# Patient Record
Sex: MALE | Race: WHITE
[De-identification: names, ages, dates, MRNs, and addresses within clinical notes are randomized per-mention and may not be internally consistent; named-entity substitution may affect disease eponyms.]

---

## 2018-09-04 ENCOUNTER — HOSPITAL ENCOUNTER (EMERGENCY)
Dept: HOSPITAL 75 - ER | Age: 53
LOS: 1 days | Discharge: HOME | End: 2018-09-05
Payer: COMMERCIAL

## 2018-09-04 VITALS — HEIGHT: 72 IN | BODY MASS INDEX: 28.44 KG/M2 | WEIGHT: 210 LBS

## 2018-09-04 DIAGNOSIS — R40.2142: ICD-10-CM

## 2018-09-04 DIAGNOSIS — R40.2362: ICD-10-CM

## 2018-09-04 DIAGNOSIS — S02.19XA: Primary | ICD-10-CM

## 2018-09-04 DIAGNOSIS — S02.81XA: ICD-10-CM

## 2018-09-04 DIAGNOSIS — R40.2252: ICD-10-CM
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
## 2018-09-04 PROCEDURE — 70450 CT HEAD/BRAIN W/O DYE: CPT

## 2018-09-04 PROCEDURE — 73110 X-RAY EXAM OF WRIST: CPT

## 2018-09-04 PROCEDURE — 70486 CT MAXILLOFACIAL W/O DYE: CPT

## 2018-09-04 PROCEDURE — 72125 CT NECK SPINE W/O DYE: CPT

## 2018-09-04 NOTE — ED TRAUMA-MULTISYSTEM
General


Chief Complaint:  Trauma-Non Activation


Stated Complaint:  BUCKED OFF HORSE, EYE/FACE INJ


Nursing Triage Note:  


Patient ambulatory to ER with complaint of being bucked off a horse around 16:

00 


today. Patient denies any loss of consciousness and states he got up and walked 


from the scene. Patient states he was dizzy at scene. Patient states this 


evening he was blowing his nose and his right face swelled and right eye 


swelled. Right eye is swelled shut. Patient also complaining of left wrist 

pain. 


 (SAI CONNER)





History of Present Illness


Date Seen by Provider:  Sep 4, 2018


Time Seen by Provider:  22:20


Initial Comments


Patient is a 53-year-old male who presents to the emergency room POV with 

reports of being bucked off a horse this evening around 1600. He reports that 

his horse became spooked them started backing causing him to go over the 

frontal saddle. He denies any loss of consciousness, neck pain, and he reports 

immediately got up and started walking around at the time of the accident. He 

was a little bit dizzy and dazed at the time of the injury but he is no longer 

dizzy on arrival to the emergency room. He reports that he did not think of 

anything of the injury tonight and around 2100 he was blowing his nose and 

after he blew his nose his face immediately swelled up, causing his right eye 

to swell closed. There are abrasions to the right side of his face. He also 

reports left wrist pain.


Occurred:  This Evening


Severity:  Mild


Pain/Injury Location:  Face, Head, Upper Extremity (left wrist)


Method of Injury:  Fall (fall from a horse)


Associated Symptoms (Fall):  Denies Symptoms, Dizziness; No Headache, No Neck 

Pain (SAI CONNER)





Allergies and Home Medications


Allergies


Coded Allergies:  


     No Known Drug Allergies (Unverified , 9/4/18)





Home Medications


Amoxicillin/Potassium Clav 1 Each Tablet, 1 EACH PO BID


   Prescribed by: SAI CONNER on 9/4/18 8576


Hydrocodone Bit/Acetaminophen 1 Tab Tab, 1 EACH PO Q4H PRN for PAIN


   Prescribed by: SAI CONNER on 9/4/18 3395





Patient Home Medication List


Home Medication List Reviewed:  Yes


 (SAI CONNER)





Review of Systems


Review of Systems


Constitutional:  see HPI; No chills, No fever


Eyes:  See HPI, Other (facial swelling that obscures the vision of the right 

eye. Patient was able to see prior to facial swelling.)


Musculoskeletal:  see HPI, joint pain (left wrist pain and swelling.), joint 

swelling (left wrist)


Skin:  see HPI, other (abrasions and swelling to the right side of the face.) (

SAI CONNER)





All Other Systems Reviewed


Negative Unless Noted:  Yes


 (SAI CONNER)





Past Medical-Social-Family Hx


Past Med/Social Hx:  Reviewed Nursing Past Med/Soc Hx


 (SAI CONNER)





Patient Social History


Recent Foreign Travel:  No


Contact w/Someone Who Travel:  No


Recent Infectious Disease Expo:  No


 (SAI CONNER)





Family Medical History


Reviewed Nursing Family Hx


 (SAI CONNER)





Physical Exam


Vital Signs





Vital Signs - First Documented








 9/4/18 9/5/18





 21:59 00:05


 


Temp 98.8 


 


Pulse 84 


 


Resp 16 


 


B/P (MAP) 150/114 (126) 


 


Pulse Ox  98


 


O2 Delivery Room Air 





 (KYLER URIBE MD)


Height, Weight, BMI


Height: 6'0"


Weight: 210lbs. oz. [id number];  BMI


Method:Stated


General Appearance:  No Apparent Distress, WD/WN


Head:  Ecchymosis (ecchymosis and abrasions to the right side of the face.), 

Swelling (moderate swelling to the right side of the face. The right eye is 

swelled shut.); No Active Bleeding, No Raccoon Eyes


Eyes:  Bilateral Eye Normal Inspection, Bilateral Eye PERRL, Bilateral Eye EOMI 

(the patient's extraocular movements are intact. He denies pain with eye 

movement.)


Ears, Nose, Throat:  Hearing Grossly Normal, No Evidence of ENT Injury, No 

Dental Injury


Neck:  No Full Range of Motion (patient was placed in a c-collar on immediate 

arrival to the emergency room.); Normal Inspection, Non Tender, Supple


Cardiovascular:  Regular Rate, Rhythm, No Edema, No Gallop, No JVD, No Murmur, 

Normal Peripheral Pulses


Respiratory:  Chest Non Tender, Lungs Clear, Normal Breath Sounds, No Accessory 

Muscle Use, No Respiratory Distress


Gastrointestinal:  Normal Bowel Sounds, No Organomegaly, No Pulsatile Mass, Non 

Tender, Soft


Back:  Normal Inspection, No CVA Tenderness, No Vertebral Tenderness


Extremity:  Normal Capillary Refill, Normal Inspection, Normal Range of Motion, 

Non Tender, No Calf Tenderness, No Pedal Edema


Neurologic/Psychiatric:  Alert, Oriented x3, No Motor/Sensory Deficits


Skin:  Normal Color, Warm/Dry, Other (abrasions to the right side of face.) (

SAI CONNER)





[place] Coma Score


Best Eye Response (Florentin):  (4) Open Spontaneously


Best Verbal Response (Florentin):  (5) Oriented


Best Motor Response ([place]):  (6) Obeys Commands


[place] Total:  15


 (SAI CONNRE)





Progress/Results/Core Measures


Results/Orders


Medications Given in ED


 (KYLER URIBE MD)


Vital Signs/I&O











 9/4/18 9/5/18





 21:59 00:05


 


Temp 98.8 98.2


 


Pulse 84 80


 


Resp 16 16


 


B/P (MAP) 150/114 (126) 135/79


 


Pulse Ox  98


 


O2 Delivery Room Air Room Air





 (KYLER URIBE MD)








Blood Pressure Mean:  126











Progress


Progress Note :  


   Time:  23:00


Progress Note


I have seen and evaluated the patient. I have removed his c-collar at this 

time. I informed him of the CT findings of a right orbital roof fracture into 

the frontal sinuses and a right medial orbital wall lamina fracture. I have 

discussed the CT findings with Dr. Mayfield and had him review the wrist x-

ray. I have called and left a message for Dr. Mensah the oral maxillary facial 

surgeon for close follow-up tomorrow. I'm treating the patient with 

prophylactic antibiotics due to the fracture into the sinus cavity. The patient 

was sent home with a take home pack of hydrocodone and a prescription was given 

for hydrocodone. The patient agrees with plans for discharge, follow up 

tomorrow with Dr. Mensah and Dr. Gaines. Return precautions were given.


 (SAI CONNER)


Progress Note :  


   Time:  09:33


Progress Note


9/5/18: Patient called back this morning and was told by Dr. Mensah's office that 

he might need to see a neurosurgeon.  He did not know what to do.  I have 

reviewed the films and the chart.  I did call and talk with Dr. Greene, ENT.  

This fracture appears to be anterior lateral and not involving the posterior 

wall.  Fractures are nondisplaced.  Dr. Greene will see him for further 

evaluation and monitoring.  If there is posterior communication or concerns 

about surgical repair then he will be referred to neurosurgery but otherwise 

will be followed by Dr. Greene.  We did send a copy of the chart to Dr. Greene's 

office who will set up appointment.  Patient to pick Copy of CT scan on disc 

for Dr. Greene's viewing.  Patient will see Dr. Greene at 1630 on 9/6/18.


 (KYLER URIBE MD)





Diagnostic Imaging





   Diagonstic Imaging:  Xray, CT


   Plain Films/CT/US/NM/MRI:  facial bones, c-spine, head, other (wrist)


Comments


2110 [facility] Report: Right orbital roof fracture, right medial lamina wall 

fracture.


   Reviewed:  Reviewed by Me, Reviewed/Discussed


 (SAI CONNER)





Departure


Impression





 Primary Impression:  


 Orbital roof fracture without intracranial injury


 Additional Impressions:  


 Orbital wall fracture


 Wrist sprain


Disposition:  01 HOME, SELF-CARE


Condition:  Stable/Unchanged





Departure-Patient Inst.


Decision time for Depature:  23:29


 (SAI CONNER)


Referrals:  


MAGDA NAVAS OD, MATTHEW DDS


Patient Instructions:  Fracture (DC)





Add. Discharge Instructions:  


Take medication as directed. Follow-up with Dr. Mensah's office within 1 week. 

Call first thing tomorrow morning for appointment time. Ice to the sore areas 

20 minutes intervals. Follow-up with an ophthalmologist within 1 week for 

recheck. Follow up with your primary care provider within 1 week for recheck. 

Return back to the emergency room for any worsening pain, visual changes, 

worsening symptoms, or any other concerns as needed. All discharge instructions 

reviewed with patient and/or family. Voiced understanding.


Scripts


Hydrocodone Bit/Acetaminophen (Hydrocodone/Acetaminophen 5/325mg Tablet) 1 Tab 

Tab


1 EACH PO Q4H PRN for PAIN, #14 TAB


   Prov: SAI CONNER         9/4/18 


Amoxicillin/Potassium Clav (Augmentin 875-125 Tablet) 1 Each Tablet


1 EACH PO BID for 10 Days, #20 TAB


   Prov: SAI CONNER         9/4/18





Copy


Copies To 2:   MAGDA NAVAS OD, TRAVIS Sep 4, 2018 22:25


KYLER URIBE MD Sep 5, 2018 09:42

## 2018-09-05 VITALS — SYSTOLIC BLOOD PRESSURE: 135 MMHG | DIASTOLIC BLOOD PRESSURE: 79 MMHG

## 2018-09-05 NOTE — DIAGNOSTIC IMAGING REPORT
PROCEDURE: CT head, face, and cervical spine without contrast.



TECHNIQUE: Multiple contiguous axial images were obtained through

the head, neck, and facial bones without the use of intravenous

contrast. Sagittal and coronal reformations through the cervical

spine and facial bones were also performed.



INDICATION:  Fall from horse with head, facial and neck injury.



CT HEAD: Multiple contiguous axial CT images of the head were

obtained. 



FINDINGS: Ventricles and sulci are within normal limits for size.

There is no intracranial hemorrhage identified. There is no

abnormal mass effect or shift of midline structures.



IMPRESSION: Unremarkable CT of the head. Please see separately

dictated facial bone CT report.



CT cervical spine:



EXAMINATION: Multiple contiguous axial CT images of the cervical

spine were obtained with sagittal and coronal reformatted images

produced.  



FINDINGS: The cervical curvature and alignment are within normal

limits. The vertebral body heights and disc spaces are maintained

without evidence of fracture or subluxation. There is no

paraspinous hematoma.



IMPRESSION: No CT evidence of acute cervical spinal abnormality.



Maxillofacial CT:



FINDINGS: There is extensive gas within the right orbit extending

into the anterior right  space and within the soft

tissues anterior to the right maxilla. There does appear to be

fracture involving the roof of the right orbit with communication

with the right frontal sinus. There is also mildly depressed

fracture involving the right lamina papyracea. The globes appear

to be intact without evidence of retrobulbar hematoma. No other

acute fracture or malalignment is seen.



IMPRESSION: Nondisplaced fracture involving the roof of the right

orbit allowing communication with the right frontal sinus.

Subcutaneous emphysema is seen along the right orbit and right

 space. There is a mildly depressed fracture involving

the right lamina papyracea without other acute fracture or

malalignment detected.



Dictated by: 



  Dictated on workstation # [id number]

## 2018-09-05 NOTE — DIAGNOSTIC IMAGING REPORT
INDICATION: Fell from horse. Bruising and swelling left wrist.



FINDINGS: 3 views. There is a small bony fragment noted just

lateral to the midportion of the scaphoid. No other abnormalities

are noted of the carpal bones. Radiocarpal joint is in good

alignment. Distal radius and ulnar are intact.



IMPRESSION: Probable small avulsion injury with bony fragment

noted just posterior and medial to the scaphoid. Uncertain of the

etiology. No other fractures are demonstrated or dislocations.



Dictated by: 



  Dictated on workstation # [id number]